# Patient Record
Sex: MALE | Employment: OTHER | URBAN - METROPOLITAN AREA
[De-identification: names, ages, dates, MRNs, and addresses within clinical notes are randomized per-mention and may not be internally consistent; named-entity substitution may affect disease eponyms.]

---

## 2022-10-28 NOTE — PATIENT INSTRUCTIONS
RESULT POLICY      If we have ordered testing for you, know that; \"NO NEWS IS GOOD NEWS! \"     It is our policy that we no longer call patients with results, nor do we  give test results over the phone. We schedule follow up appointments so that your results can be discussed in person. This allows you to address any questions you have regarding the results. If you choose to go to an imaging center outside of [facility], it is your responsibility to bring imaging report and disc to follow up appointment. If something of concern is revealed on your test, we will contact you to discuss the matter and if needed schedule a sooner follow up appointment. Additionally, results may be found by using the My Chart feature and one of our patient service representatives at the  can give you instructions on how to access this feature to utilize our electronic medical record system. Thank you for your understanding. [facility]   Statement to Patients  April 1, 2014      In an effort to ensure the large volume of patient prescription refills is processed in the most efficient and expeditious manner, we are asking our patients to assist us by calling your Pharmacy for all prescription refills, this will include also your  Mail Order Pharmacy. The pharmacy will contact our office electronically to continue the refill process. Please do not wait until the last minute to call your pharmacy. We need at least 48 hours (2days) to fill prescriptions. We also encourage you to call your pharmacy before going to  your prescription to make sure it is ready. With regard to controlled substance prescription refill requests (narcotic refills) that need to be picked up at our office, we ask your cooperation by providing us with at least 72 hours (3days) notice that you will need a refill.     We will not refill narcotic prescription refill requests after 4:00pm on any weekday, Monday through Thursday, or after 2:00pm on Fridays, or on the weekends. We encourage everyone to explore another way of getting your prescription refill request processed using [employer], our patient web portal through our electronic medical record system. [employer] is an efficient and effective way to communicate your medication request directly to the office and  downloadable as an petar on your smart phone . [employer] also features a review functionality that allows you to view your medication list as well as leave messages for your physician. Are you ready to get connected? If so please review the attatched instructions or speak to any of our staff to get you set up right away! Thank you so much for your cooperation. Should you have any questions please contact our Practice Administrator.

## 2022-11-03 ENCOUNTER — OFFICE VISIT (OUTPATIENT)
Dept: NEUROLOGY | Age: 57
End: 2022-11-03
Payer: COMMERCIAL

## 2022-11-03 VITALS
SYSTOLIC BLOOD PRESSURE: 132 MMHG | HEART RATE: 91 BPM | BODY MASS INDEX: 36.98 KG/M2 | DIASTOLIC BLOOD PRESSURE: 80 MMHG | HEIGHT: 72 IN | OXYGEN SATURATION: 95 % | RESPIRATION RATE: 18 BRPM | WEIGHT: 273 LBS

## 2022-11-03 DIAGNOSIS — R29.2 ABNORMAL DTR (DEEP TENDON REFLEX): ICD-10-CM

## 2022-11-03 DIAGNOSIS — S14.101S SPINAL CORD INJURY AT C1-C4 LEVEL, SEQUELA (HCC): Primary | ICD-10-CM

## 2022-11-03 DIAGNOSIS — G81.94 LEFT HEMIPARESIS (HCC): ICD-10-CM

## 2022-11-03 PROCEDURE — 99202 OFFICE O/P NEW SF 15 MIN: CPT | Performed by: PSYCHIATRY & NEUROLOGY

## 2022-11-03 RX ORDER — ZOLPIDEM TARTRATE 5 MG/1
5 TABLET ORAL
COMMUNITY
Start: 2022-10-02

## 2022-11-03 RX ORDER — ARIPIPRAZOLE 5 MG/1
TABLET ORAL
COMMUNITY
Start: 2022-10-31

## 2022-11-03 RX ORDER — APIXABAN 5 MG/1
5 TABLET, FILM COATED ORAL 2 TIMES DAILY
COMMUNITY
Start: 2022-10-28

## 2022-11-03 RX ORDER — CLONAZEPAM 1 MG/1
1 TABLET ORAL 2 TIMES DAILY
COMMUNITY
Start: 2022-10-01

## 2022-11-03 RX ORDER — ATORVASTATIN CALCIUM 20 MG/1
20 TABLET, FILM COATED ORAL DAILY
COMMUNITY
Start: 2022-10-09

## 2022-11-03 RX ORDER — VENLAFAXINE HYDROCHLORIDE 37.5 MG/1
CAPSULE, EXTENDED RELEASE ORAL
COMMUNITY
Start: 2022-10-31

## 2022-11-03 RX ORDER — GUAIFENESIN 100 MG/5ML
81 LIQUID (ML) ORAL DAILY
COMMUNITY

## 2022-11-03 RX ORDER — VENLAFAXINE HYDROCHLORIDE 150 MG/1
CAPSULE, EXTENDED RELEASE ORAL
COMMUNITY
Start: 2022-10-31

## 2022-11-03 RX ORDER — LISINOPRIL 40 MG/1
40 TABLET ORAL DAILY
COMMUNITY
Start: 2022-10-09

## 2022-11-03 NOTE — PROGRESS NOTES
No chief complaint on file.       Visit Vitals  Resp 18   Ht 6' (1.829 m)   Wt 273 lb (123.8 kg)   BMI 37.03 kg/m²

## 2022-11-03 NOTE — PROGRESS NOTES
[facility] and [address] at [facility] at [facility] [address], [address] [facility] [address] [address] [facility], [address] [place] [address]  [phone] Office  [phone] Facsimile           Referring: Alfredo Jenkins MD      No chief complaint on file. Patient presents is a 57-year-old gentleman for neurologic consultation regarding spinal cord injury. Back in 2010 he walked into a door that was locked unbeknownst to him and he fell down his basement stairs. He had central cord syndrome. He had cervical cord injury and he thinks maybe it was at C3 but he is not certain. In any regard he was in the hospital for a week or so and after the swelling in the cord went down he spent a month in rehab where he had to relearn to walk and to move. Over the years he is having increasing difficulty with his left side particularly greater than the right. Fine motor is quite affected. His ambulation has changed and that he is dragging his left leg behind him. He just moved up from [facility] and established with Dr. Wanda Sevilla. He has been sent here for neurologic evaluation. He was in the [facility] in [facility] and [facility] He also has sequela of this injury had pulmonary embolus and is on Eliquis. Past Medical History:   Diagnosis Date    Pulmonary air embolism ([address]        No past surgical history on file. Current Outpatient Medications   Medication Sig Dispense Refill    Eliquis 5 mg tablet Take 5 mg by mouth two (2) times a day. ARIPiprazole (ABILIFY) 5 mg tablet       atorvastatin (LIPITOR) 20 mg tablet Take 20 mg by mouth daily. clonazePAM (KlonoPIN) 1 mg tablet Take 1 mg by mouth two (2) times a day. lisinopriL (PRINIVIL, ZESTRIL) 40 mg tablet Take 40 mg by mouth daily.       venlafaxine-SR (EFFEXOR-XR) 150 mg capsule       venlafaxine-SR (EFFEXOR-XR) 37.5 mg capsule zolpidem (AMBIEN) 5 mg tablet Take 5 mg by mouth nightly. aspirin 81 mg chewable tablet Take 81 mg by mouth daily. Not on File    Social History     Tobacco Use    Smoking status: Some Days     Types: Cigarettes    Smokeless tobacco: Never   Vaping Use    Vaping Use: Never used   Substance Use Topics    Drug use: Never       Family History   Problem Relation Age of Onset    Heart Disease Mother     Arthritis-rheumatoid Mother     Cancer Father        Review of Systems  Pertinent positives and negatives as noted. Examination  Visit Vitals  /80   Pulse 91   Resp 18   Ht 6' (1.829 m)   Wt 123.8 kg (273 lb)   SpO2 95%   BMI 37.03 kg/m²     He is a pleasant engaging gentleman. He is awake alert and oriented. He has normal speech and normal language. Cognition is normal.  Cranial nerves intact with the exception of decreased shoulder shrug on the left versus the right. He has increased tone in all 4 extremities left greater than right. He is able to give full resistance in the right upper and lower extremities bilaterally. In the left upper extremity 4/5 at the tricep and deltoid. Fine motor movement including hand opening closing and finger tapping decreased left versus right. Brisk reflexes throughout. Ian present bilaterally. Clonus at the ankles bilaterally. With ambulation he walks with a left hemiparetic type gait    Impression/Plan  Nice 56year-old gentleman status post cervical cord injury with spastic left hemiparesis abnormal reflexes, Ian, and clonus all sequela of the injury he describes  Will send for his records  Would also like to see what the cord looks like at this point i.e. myelomalacia etc. we will get an MRI of the cervical spine  Follow-up after    Moira Streeter MD          This note was created using voice recognition software. Despite editing, there may be syntax errors.

## 2022-11-23 ENCOUNTER — TELEPHONE (OUTPATIENT)
Dept: NEUROLOGY | Age: 57
End: 2022-11-23

## 2022-11-23 NOTE — TELEPHONE ENCOUNTER
See previous tele encounter from 11/16/22, this was addressed no vomiting/no chills/no decreased eating/drinking/no fever/no weakness/no tingling/no dizziness/no nausea

## 2022-11-28 ENCOUNTER — TELEPHONE (OUTPATIENT)
Dept: NEUROLOGY | Age: 57
End: 2022-11-28

## 2022-11-28 NOTE — TELEPHONE ENCOUNTER
Pt states he is still being told by the imaging place that they are being told by insurance that  p2p is required for mri approval despite faxing office visit information, please call pt with possible next steps.

## 2022-11-30 NOTE — TELEPHONE ENCOUNTER
See phone encounter 11/16/22. S/w Elva PATTERSON She confirms they received clinical notes from [facility] on 11/3/22. She states they did not find enough evidence to warrant MRI, even with the PMHx    They are requesting documentation that   Pt has had 6 consecutive weeks of PT, oral/injected steroids, or pain medications prior to approving MRI. S/w pt and sent him my chart message about filing with the [facility]. He states he will do this today.     Requested him contact us with any status change physically

## 2023-01-24 ENCOUNTER — TELEPHONE (OUTPATIENT)
Dept: NEUROLOGY | Age: 58
End: 2023-01-24

## 2023-01-24 NOTE — TELEPHONE ENCOUNTER
Pt requesting call from nurse to get more information about condition requirements for mri. Pt is going through mri appeal and needs more info to provider for them.

## 2023-01-27 ENCOUNTER — TELEPHONE (OUTPATIENT)
Dept: NEUROLOGY | Age: 58
End: 2023-01-27

## 2023-01-27 NOTE — TELEPHONE ENCOUNTER
Pt states assistance is needed with appealing a denial for MRI. Requesting a call back to discuss. Please contact pt.

## 2023-01-30 NOTE — TELEPHONE ENCOUNTER
Pt states [facility] was unable to assist pt with insurance denial of MRI as [employer] is privately funded\"    Pt asked us to call Radha at [phone] and leave the following on the VM:    case ID [id number]  Office call back number  Reason for need of expedition of appeal     THEN    [phone]     This has been completed.

## 2023-01-31 ENCOUNTER — TELEPHONE (OUTPATIENT)
Dept: NEUROLOGY | Age: 58
End: 2023-01-31

## 2023-02-01 NOTE — TELEPHONE ENCOUNTER
Returned call, pt was verifying that request for expedited appeal was faxed.   Notified that yes, it was faxed on 1/30/23 with confirmation rec'd

## 2023-02-23 ENCOUNTER — OFFICE VISIT (OUTPATIENT)
Dept: NEUROLOGY | Age: 58
End: 2023-02-23
Payer: COMMERCIAL

## 2023-02-23 VITALS
OXYGEN SATURATION: 97 % | RESPIRATION RATE: 16 BRPM | DIASTOLIC BLOOD PRESSURE: 87 MMHG | HEART RATE: 68 BPM | SYSTOLIC BLOOD PRESSURE: 133 MMHG

## 2023-02-23 DIAGNOSIS — S14.101S SPINAL CORD INJURY AT C1-C4 LEVEL, SEQUELA (HCC): Primary | ICD-10-CM

## 2023-02-23 DIAGNOSIS — G81.94 LEFT HEMIPARESIS (HCC): ICD-10-CM

## 2023-02-23 PROCEDURE — 99212 OFFICE O/P EST SF 10 MIN: CPT | Performed by: PSYCHIATRY & NEUROLOGY

## 2023-02-23 NOTE — PROGRESS NOTES
[facility] and [address] at [facility] at [address] [facility], [address] [facility] [address] [facility], [address] [place] [address]   [phone]              Chief Complaint   Patient presents with    Neurologic Problem     Appeals denied for MRI. Sx are getting progressively worse. Current Outpatient Medications   Medication Sig Dispense Refill    Eliquis 5 mg tablet Take 5 mg by mouth two (2) times a day. ARIPiprazole (ABILIFY) 5 mg tablet       atorvastatin (LIPITOR) 20 mg tablet Take 20 mg by mouth daily. clonazePAM (KlonoPIN) 1 mg tablet Take 1 mg by mouth two (2) times a day. lisinopriL (PRINIVIL, ZESTRIL) 40 mg tablet Take 40 mg by mouth daily. venlafaxine-SR (EFFEXOR-XR) 150 mg capsule       venlafaxine-SR (EFFEXOR-XR) 37.5 mg capsule       zolpidem (AMBIEN) 5 mg tablet Take 5 mg by mouth nightly. aspirin 81 mg chewable tablet Take 81 mg by mouth daily. No Known Allergies  Social History     Tobacco Use    Smoking status: Some Days     Types: Cigarettes    Smokeless tobacco: Never   Vaping Use    Vaping Use: Never used   Substance Use Topics    Drug use: Never     44-year-old gentleman returns for follow-up today. I saw him in November for initial consultation. He has a history of cervical cord injury and he was having increasing difficulty with his left side. His examination demonstrated spastic left hemiparesis with abnormal reflexes Ian and clonus. I ordered an MRI of his cervical spine and his insurance company, and their infant was then decided that was not necessary and thus it has not been done. He continues with his left-sided weakness. Examination  Visit Vitals  /87   Pulse 68   Resp 16   SpO2 97%     He is a very pleasant gentleman. He is awake alert and oriented. He has normal speech and normal language.   His cognition is normal.  He has spastic left-sided hemiparesis with increased reflexes versus the right. Left-sided spastic hemiparetic type gait    Impression/Plan  Very nice 44-year-old gentleman status post spinal cord injury with left spastic hemiparesis and gait disorder secondary with increasing difficulty in functioning  We discussed his insurance company's denial of the MRI  At this juncture he is going to take a watchful approach in terms of the imaging  That makes no sense to send him to physical therapy which is what his insurance company wants to do  We will leave follow-up open for now    Lindy Silva MD        This note was created using voice recognition software. Despite editing, there may be syntax errors.

## 2023-08-30 ENCOUNTER — OFFICE VISIT (OUTPATIENT)
Age: 58
End: 2023-08-30
Payer: COMMERCIAL

## 2023-08-30 VITALS
SYSTOLIC BLOOD PRESSURE: 113 MMHG | HEIGHT: 74 IN | HEART RATE: 91 BPM | BODY MASS INDEX: 33.37 KG/M2 | DIASTOLIC BLOOD PRESSURE: 67 MMHG | WEIGHT: 260 LBS | OXYGEN SATURATION: 95 % | RESPIRATION RATE: 20 BRPM

## 2023-08-30 DIAGNOSIS — G81.93 HEMIPARESIS OF RIGHT NONDOMINANT SIDE DUE TO NON-CEREBROVASCULAR ETIOLOGY (HCC): ICD-10-CM

## 2023-08-30 DIAGNOSIS — S14.101S: Primary | ICD-10-CM

## 2023-08-30 PROCEDURE — 99212 OFFICE O/P EST SF 10 MIN: CPT | Performed by: PSYCHIATRY & NEUROLOGY

## 2023-08-30 ASSESSMENT — PATIENT HEALTH QUESTIONNAIRE - PHQ9
SUM OF ALL RESPONSES TO PHQ QUESTIONS 1-9: 0
2. FEELING DOWN, DEPRESSED OR HOPELESS: 0
SUM OF ALL RESPONSES TO PHQ9 QUESTIONS 1 & 2: 0
SUM OF ALL RESPONSES TO PHQ QUESTIONS 1-9: 0
1. LITTLE INTEREST OR PLEASURE IN DOING THINGS: 0

## 2023-08-31 ENCOUNTER — TELEPHONE (OUTPATIENT)
Age: 58
End: 2023-08-31

## 2023-08-31 NOTE — TELEPHONE ENCOUNTER
Mildred Melgoza from [place] called in to inform that provider needs to contact [phone] [address] for PA of MRI that patient has on 09/07/23.     P- [phone]

## 2023-09-07 ENCOUNTER — HOSPITAL ENCOUNTER (OUTPATIENT)
Facility: HOSPITAL | Age: 58
Discharge: HOME | End: 2023-09-07
Attending: PSYCHIATRY & NEUROLOGY
Payer: COMMERCIAL

## 2023-09-07 DIAGNOSIS — G81.93 HEMIPARESIS OF RIGHT NONDOMINANT SIDE DUE TO NON-CEREBROVASCULAR ETIOLOGY (HCC): ICD-10-CM

## 2023-09-07 DIAGNOSIS — S14.101S: ICD-10-CM

## 2023-09-07 PROCEDURE — 72156 MRI NECK SPINE W/O & W/DYE: CPT

## 2023-09-07 PROCEDURE — A9579 GAD-BASE MR CONTRAST NOS,1ML: HCPCS | Performed by: RADIOLOGY

## 2023-09-07 PROCEDURE — 6360000004 HC RX CONTRAST MEDICATION: Performed by: RADIOLOGY

## 2023-09-07 RX ADMIN — GADOTERIDOL 20 ML: 279.3 INJECTION, SOLUTION INTRAVENOUS at 08:37

## 2023-09-11 ENCOUNTER — OFFICE VISIT (OUTPATIENT)
Age: 58
End: 2023-09-11
Payer: COMMERCIAL

## 2023-09-11 VITALS
TEMPERATURE: 97.7 F | RESPIRATION RATE: 18 BRPM | HEART RATE: 99 BPM | OXYGEN SATURATION: 95 % | SYSTOLIC BLOOD PRESSURE: 124 MMHG | DIASTOLIC BLOOD PRESSURE: 85 MMHG

## 2023-09-11 DIAGNOSIS — M48.02 CERVICAL STENOSIS OF SPINAL CANAL: Primary | ICD-10-CM

## 2023-09-11 DIAGNOSIS — G81.93 HEMIPARESIS OF RIGHT NONDOMINANT SIDE DUE TO NON-CEREBROVASCULAR ETIOLOGY (HCC): ICD-10-CM

## 2023-09-11 DIAGNOSIS — S14.101S: ICD-10-CM

## 2023-09-11 PROCEDURE — 99213 OFFICE O/P EST LOW 20 MIN: CPT | Performed by: PSYCHIATRY & NEUROLOGY
